# Patient Record
Sex: MALE | Race: BLACK OR AFRICAN AMERICAN | URBAN - METROPOLITAN AREA
[De-identification: names, ages, dates, MRNs, and addresses within clinical notes are randomized per-mention and may not be internally consistent; named-entity substitution may affect disease eponyms.]

---

## 2024-04-22 ENCOUNTER — OFFICE VISIT (OUTPATIENT)
Dept: INTERNAL MEDICINE CLINIC | Facility: CLINIC | Age: 54
End: 2024-04-22
Payer: COMMERCIAL

## 2024-04-22 VITALS
HEART RATE: 96 BPM | WEIGHT: 131 LBS | HEIGHT: 69 IN | BODY MASS INDEX: 19.4 KG/M2 | SYSTOLIC BLOOD PRESSURE: 135 MMHG | OXYGEN SATURATION: 97 % | DIASTOLIC BLOOD PRESSURE: 78 MMHG

## 2024-04-22 DIAGNOSIS — J21.0 RSV (ACUTE BRONCHIOLITIS DUE TO RESPIRATORY SYNCYTIAL VIRUS): ICD-10-CM

## 2024-04-22 DIAGNOSIS — Z12.5 SCREENING FOR PROSTATE CANCER: ICD-10-CM

## 2024-04-22 DIAGNOSIS — Z13.1 SCREENING FOR DIABETES MELLITUS: ICD-10-CM

## 2024-04-22 DIAGNOSIS — Z83.3 FAMILY HISTORY OF DIABETES MELLITUS: ICD-10-CM

## 2024-04-22 DIAGNOSIS — R63.4 WEIGHT LOSS: ICD-10-CM

## 2024-04-22 DIAGNOSIS — Z11.3 SCREEN FOR STD (SEXUALLY TRANSMITTED DISEASE): ICD-10-CM

## 2024-04-22 DIAGNOSIS — Z00.00 PE (PHYSICAL EXAM), ROUTINE: Primary | ICD-10-CM

## 2024-04-22 DIAGNOSIS — R05.8 OTHER COUGH: ICD-10-CM

## 2024-04-22 RX ORDER — HYDROCHLOROTHIAZIDE 25 MG/1
25 TABLET ORAL DAILY
COMMUNITY
Start: 2023-02-14

## 2024-04-22 NOTE — PROGRESS NOTES
Subjective:   Patient ID: Clint Phan is a 53 year old male.  Chief Complaint   Patient presents with    Hospital F/U     Was admitted [facility] on 4/17 or 4/18 due to shortness of breath, no appetite, weakness.       Patient presented today for the follow-up after  ER visit -[facility] on 4/11-4/12    Patient presented to emergency room due to cough sore throat fatigue fever and weakness patient stated he was diagnosed with RSV and discharged home patient states that he feels at home with his parents in [place] and slowly improved he was drinking lots of water and fluids and taking Tylenol 500 mg 3-4 times daily.  His appetite was very bad he lost so much weight over 10 pounds but patient states he is eating better now and he is maintaining his weight.  He does smoke but he cut down significantly on smoking at this time he just takes a few smokes to get rid of the nicotine craving.  Patient plans to not smoke anymore.  Patient states he is otherwise pretty healthy he does have history of hypertension is seen his doctor long time ago and he was taking medication without without blood test  Patient stated he has not been taking medications regularly  patient stated he feels he lost a lot of  weight du to loss of muscles since could not working out  he was mostly in bed-recovering , patient states he lives with his parents now , that took care of him  Now he is eating better , sleeping better  And he is starting to gain some weight.    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      Patient denies any drug abuse    Does he smokes marijuana on daily basis    Lost   weight  from  muscle - work out  -   Feel  much better  70 % better     Denies  sob  , no Kaur    CXR  pa lat   normal  - per patient     HPI    History/Other:   Review of Systems   Constitutional:  Positive for fatigue (improving). Negative for chills and fever.   HENT:  Positive for rhinorrhea  and sore throat (improved -  feels  like  scretchy throat -ant lat -neck  lymh nodes   tender  -improving). Negative for ear pain, postnasal drip and voice change.    Eyes:  Negative for pain and redness.   Respiratory:  Positive for cough (70 % better). Negative for shortness of breath and wheezing.    Cardiovascular:  Negative for chest pain, palpitations and leg swelling.        No  WILKS    Gastrointestinal:  Negative for abdominal pain, constipation, diarrhea, nausea and vomiting.   Genitourinary:  Negative for dysuria and frequency.   Skin:  Negative for pallor.   Neurological:  Negative for dizziness and headaches.   Psychiatric/Behavioral:  Negative for confusion. The patient is not nervous/anxious.      Current Outpatient Medications   Medication Sig Dispense Refill    hydroCHLOROthiazide 25 MG Oral Tab Take 1 tablet (25 mg total) by mouth daily.       Allergies:No Known Allergies    Objective:   Physical Exam  Vitals and nursing note reviewed.   Constitutional:       General: He is not in acute distress.     Appearance: He is well-developed. He is ill-appearing. He is not toxic-appearing or diaphoretic.      Interventions: Face mask in place.   HENT:      Head: Normocephalic and atraumatic.      Right Ear: Tympanic membrane, ear canal and external ear normal. There is no impacted cerumen.      Left Ear: Tympanic membrane, ear canal and external ear normal. There is no impacted cerumen.      Nose: Rhinorrhea present. No congestion.      Right Sinus: No maxillary sinus tenderness or frontal sinus tenderness.      Left Sinus: No maxillary sinus tenderness or frontal sinus tenderness.      Mouth/Throat:      Mouth: Mucous membranes are moist.      Pharynx: Uvula midline. No oropharyngeal exudate or posterior oropharyngeal erythema.   Eyes:      General: No scleral icterus.        Right eye: No discharge.         Left eye: No discharge.      Conjunctiva/sclera: Conjunctivae normal.   Neck:      Thyroid: No  thyromegaly.      Vascular: No JVD.   Cardiovascular:      Rate and Rhythm: Normal rate and regular rhythm.      Heart sounds: Normal heart sounds. No murmur heard.  Pulmonary:      Effort: Pulmonary effort is normal. No respiratory distress.      Breath sounds: Normal breath sounds. No wheezing, rhonchi or rales.   Chest:      Chest wall: No tenderness.   Abdominal:      Palpations: Abdomen is soft. There is no mass.      Tenderness: There is no abdominal tenderness. There is no right CVA tenderness or left CVA tenderness.      Comments: No hepatomegaly, no splenomegaly   Musculoskeletal:      Cervical back: Neck supple.      Right lower leg: No edema.      Left lower leg: No edema.   Lymphadenopathy:      Cervical: No cervical adenopathy.   Skin:     General: Skin is warm and dry.   Neurological:      Mental Status: He is alert and oriented to person, place, and time.   Psychiatric:         Behavior: Behavior normal.     Blood pressure 135/78, pulse 96, height 5' 9\" (1.753 m), weight 131 lb (59.4 kg), SpO2 97%.      Assessment & Plan:          1. RSV (acute bronchiolitis due to respiratory syncytial virus)   Other cough - 70 % better   Weight loss    Smoker  1 ppd -  cut down smoking significantly -counseled to quit smoking recommend patient to continue present management    Drink lots of fluids 60 ounces of water per day  If 3 meals and 2 snacks  Patient recovered significantly but still coughing  Recommend to complete the blood test as well as the chest x-ray  No records available patient will bring the hospital records next visit time as well as we will try to get the records from hospital  - CBC With Differential With Platelet  - Comp Metabolic Panel (14)  - Lipid Panel  - TSH W Reflex To Free T4  - Urinalysis with Culture Reflex  Continue present management improving if any worsening symptoms patient to go to emergency room    Patient will follow-up after blood test and chest x-ray for approval to go back  to-work  He is still recovering    Screening for prostate cancer  - PSA, TOTAL [5363] [Q]    - XR CHEST PA + LAT CHEST (IDI=[id number]); Future      Screen for STD (sexually transmitted disease)  No symptoms patient denies any STD symptoms-patient stated he is  Not  risk per pt using always condoms    He would like  std  screen including HIV       Screening for diabetes mellitus  Fh  diabetes - his mother   - Hemoglobin A1C  - HCV Antibody  - Hepatitis B Surface Antigen  - HIV AG AB Combo  - T PALLIDUM SCREENING CASCADE  - Chlamydia/Gc Amplification  Keep with low sugar and carbohydrate diet keep with good water hydration labs to complete        - Hemoglobin A1C         F/u in 3-4  days -appointment scheduled for patient  Patient states he only had chest x-ray in the hospital not but no blood test   that he was told-his chest x-ray was good   he did not have a blood test recommend patient-to have the blood test chest x-ray   And follow-up in few days for evaluation treatment      Marijuana use daily   recommend to cut down marijuana-  Patient drive forklift recommend to avoid any sedating medication that can interfere with his alertness  Patient states he is pretty much healthy otherwise he will come back in couple days for follow-up visit.   patient agrees verbalized understanding compliance      Hypertension   Keep with low salt diet  Recommend patient to hold on the hyper hydrochlorothiazide at this time  Would recommend amlodipine for patient after blood test is completed we will discuss with patient follow-up as scheduled after blood test and chest x-ray          Orders Placed This Encounter   Procedures    CBC With Differential With Platelet    Comp Metabolic Panel (14)    Lipid Panel    TSH W Reflex To Free T4    Urinalysis with Culture Reflex    PSA, TOTAL [5363] [Q]    Hemoglobin A1C    HCV Antibody    Hepatitis B Surface Antigen    HIV AG AB Combo    T PALLIDUM SCREENING CASCADE    Chlamydia/Gc Amplification        Meds This Visit:  Requested Prescriptions      No prescriptions requested or ordered in this encounter       Imaging & Referrals:  XR CHEST PA + LAT CHEST (CPT=[id number])

## 2024-04-23 ENCOUNTER — TELEPHONE (OUTPATIENT)
Dept: INTERNAL MEDICINE CLINIC | Facility: CLINIC | Age: 54
End: 2024-04-23

## 2024-04-23 NOTE — TELEPHONE ENCOUNTER
DIANNE form filled out to obtain pts medical records from [facility] from his last hospitalization.  Completed DIANNE form and contacted medical records at [facility].  Faxed DIANNE form to [phone].  Will wait for medical records to be faxed.

## 2024-05-16 ENCOUNTER — MED REC SCAN ONLY (OUTPATIENT)
Dept: INTERNAL MEDICINE CLINIC | Facility: CLINIC | Age: 54
End: 2024-05-16